# Patient Record
Sex: MALE | Race: AMERICAN INDIAN OR ALASKA NATIVE | NOT HISPANIC OR LATINO | Employment: FULL TIME | URBAN - METROPOLITAN AREA
[De-identification: names, ages, dates, MRNs, and addresses within clinical notes are randomized per-mention and may not be internally consistent; named-entity substitution may affect disease eponyms.]

---

## 2021-06-26 ENCOUNTER — HOSPITAL ENCOUNTER (EMERGENCY)
Facility: CLINIC | Age: 31
Discharge: OTHER INSTITUTION | End: 2021-06-26
Attending: EMERGENCY MEDICINE | Admitting: EMERGENCY MEDICINE
Payer: MEDICAID

## 2021-06-26 VITALS
HEART RATE: 77 BPM | TEMPERATURE: 96.4 F | OXYGEN SATURATION: 99 % | RESPIRATION RATE: 14 BRPM | DIASTOLIC BLOOD PRESSURE: 89 MMHG | SYSTOLIC BLOOD PRESSURE: 136 MMHG

## 2021-06-26 DIAGNOSIS — F19.10 POLYSUBSTANCE ABUSE (H): ICD-10-CM

## 2021-06-26 PROCEDURE — 99283 EMERGENCY DEPT VISIT LOW MDM: CPT

## 2021-06-26 PROCEDURE — 99283 EMERGENCY DEPT VISIT LOW MDM: CPT | Performed by: EMERGENCY MEDICINE

## 2021-06-26 RX ORDER — FUROSEMIDE 20 MG
20 TABLET ORAL
COMMUNITY

## 2021-06-26 RX ORDER — MIRTAZAPINE 15 MG/1
15 TABLET, ORALLY DISINTEGRATING ORAL AT BEDTIME
COMMUNITY

## 2021-06-26 RX ORDER — LEVOTHYROXINE SODIUM 100 UG/1
100 TABLET ORAL DAILY
COMMUNITY

## 2021-06-26 RX ORDER — VENLAFAXINE 37.5 MG/1
37.5 TABLET ORAL DAILY
COMMUNITY

## 2021-06-26 RX ORDER — GABAPENTIN 300 MG/1
900 CAPSULE ORAL 4 TIMES DAILY
COMMUNITY

## 2021-06-26 NOTE — ED PROVIDER NOTES
ED Provider Note  [facility]      History     Chief Complaint   Patient presents with     Addiction Problem     Detox from heroine, methamphetamine     HPI  Moilna Figueroa is a 31 year old male presents stating that he wants to detox from methamphetamine and heroin.  Patient states that he relapsed sometime in the past week and was kicked out of his sober living house.  He apparently is using an unspecified amount of heroin and methamphetamine the past several days.    Past Medical History  Past Medical History:   Diagnosis Date     Anxiety      Binge eating      Bipolar 1 disorder (H)     not extinguished between bipolar 1 or 2 per pt     Depressive disorder      Insomnia      PTSD (post-traumatic stress disorder)      Thyroid disease      History reviewed. No pertinent surgical history.  furosemide (LASIX) 20 MG tablet  gabapentin (NEURONTIN) 300 MG capsule  levothyroxine (SYNTHROID/LEVOTHROID) 100 MCG tablet  mirtazapine (REMERON SOL-TAB) 15 MG ODT  venlafaxine (EFFEXOR) 37.5 MG tablet  hydrocortisone with aloe 1 % Crea cream      No Known Allergies  Family History  No family history on file.  Social History   Social History     Tobacco Use     Smoking status: Current Every Day Smoker     Smokeless tobacco: Current User   Substance Use Topics     Alcohol use: Not Currently     Drug use: Yes     Types: Methamphetamines     Comment: heroine last use wednesday night      Past medical history, past surgical history, medications, allergies, family history, and social history were reviewed with the patient. No additional pertinent items.       Review of Systems  A complete review of systems was performed with pertinent positives and negatives noted in the HPI, and all other systems negative.    Physical Exam   BP: (!) 143/96  Pulse: 80  Temp: 96  F (35.6  C)  Resp: 18  SpO2: 96 %  Physical Exam  Constitutional:       General: He is not in acute distress.     Appearance: He is obese. He  is not diaphoretic.   HENT:      Head: Atraumatic.   Eyes:      General: No scleral icterus.     Pupils: Pupils are equal, round, and reactive to light.   Cardiovascular:      Heart sounds: Normal heart sounds.   Pulmonary:      Effort: No respiratory distress.      Breath sounds: Normal breath sounds.   Abdominal:      General: Bowel sounds are normal.      Palpations: Abdomen is soft.      Tenderness: There is no abdominal tenderness.   Musculoskeletal:         General: No tenderness.   Skin:     General: Skin is warm.      Findings: No rash.   Psychiatric:         Attention and Perception: He is inattentive. He does not perceive visual hallucinations.         Mood and Affect: Mood normal. Affect is inappropriate.         Speech: Speech is delayed.         Behavior: Behavior is slowed. Behavior is cooperative.         Cognition and Memory: Cognition is impaired. Memory is impaired.         Judgment: Judgment is impulsive and inappropriate.         ED Course      Procedures        The medical record was reviewed and interpreted.       No results found for any visits on 06/26/21.  Medications - No data to display     Assessments & Plan (with Medical Decision Making)   31-year-old male who presented initially asking for detox from meth, Klonopin and Suboxone.  Differential included intoxication, malingering, mental illness, withdrawal, metabolic derangement, closed head injury.  Patient was noted initially to be manifesting symptoms of intoxication upon presentation.  He was observed in the emergency room more than 8 hours most of which time he was sleeping.  He was rechecked upon awakening and states that he recently relapsed on methamphetamine.  He is not homicidal, suicidal, psychotic or holdable.  He will be given rule 25 information.  I have asked him to follow-up with his primary prescribing provider if he is intent on discontinuing Klonopin and Suboxone.    I have reviewed the nursing notes. I have reviewed  the findings, diagnosis, plan and need for follow up with the patient.    New Prescriptions    No medications on file       Final diagnoses:   Polysubstance abuse (H)       --  Yohan Kurtz MD  [facility] EMERGENCY DEPARTMENT  6/26/2021     Yohan Kurtz MD  06/26/21 0016

## 2021-12-21 ENCOUNTER — HOSPITAL ENCOUNTER (EMERGENCY)
Facility: HOSPITAL | Age: 31
Discharge: HOME | End: 2021-12-21
Attending: EMERGENCY MEDICINE | Admitting: EMERGENCY MEDICINE
Payer: MEDICAID

## 2021-12-21 VITALS
HEART RATE: 78 BPM | SYSTOLIC BLOOD PRESSURE: 120 MMHG | TEMPERATURE: 97.3 F | DIASTOLIC BLOOD PRESSURE: 74 MMHG | WEIGHT: 300 LBS | RESPIRATION RATE: 16 BRPM | OXYGEN SATURATION: 95 % | BODY MASS INDEX: 39.58 KG/M2

## 2021-12-21 DIAGNOSIS — R21 RASH: ICD-10-CM

## 2021-12-21 PROCEDURE — 99283 EMERGENCY DEPT VISIT LOW MDM: CPT

## 2021-12-21 RX ORDER — PREDNISONE 20 MG/1
40 TABLET ORAL DAILY
Qty: 10 TABLET | Refills: 0 | Status: SHIPPED | OUTPATIENT
Start: 2021-12-21 | End: 2021-12-26

## 2021-12-21 NOTE — DISCHARGE INSTRUCTIONS
Take the low dose steroid for the next 5 days to see if this helps get rid of your rash.  Follow up with your primary care physician for reevaluation or return back to the ED sooner for any worsening rash or other new or concerning symptoms.

## 2021-12-21 NOTE — ED PROVIDER NOTES
ED Provider In Triage Note  [facility]  Encounter Date: Dec 21, 2021    Chief Complaint   Patient presents with     Rash       Brief HPI:   Molina Figueroa is a 31 year old male presenting to the Emergency Department with a chief complaint of a rash on his chest and upper abdomen intermittently for the last few months.  The rash is itching and typically resolves on its own.  The patient was seen in the ED for this same rash and told that it was hives. The patient denies any shortness of breath or angioedema. The patient denies any associated foods, soaps, or detergents.     ROS:  Denies any shortness of breath, angioedema, chest pain, or nausea and vomiting.  8 point ROS is otherwise negative.     SOCIAL: Daily smoker    PMH: Thyroid disease, Anxiety, Depression      Brief Physical Exam:  /74   Pulse 78   Temp 97.3  F (36.3  C) (Temporal)   Resp 16   Wt 136.1 kg (300 lb)   SpO2 95%   BMI 39.58 kg/m    General presentation: Alert, Vital signs reviewed. No apparent distress.   HENT: ENT inspection is normal. Oropharynx is moist and clear.   Eye: Pupils are equal and reactive to light. EOMI  Neck: The neck is supple.  Pulmonary: Currently in no acute respiratory distress.   Circulatory: Peripheral pulses are strong and equal in the bilateral upper lower extremities.   Neurologic: Alert, oriented to person, place, and time. No gross motor or sensory deficits noted in the upper or lower extremities. Cranial nerves II through XII are grossly intact.  Musculoskeletal: No extremity tenderness. Full range of motion in all extremities. No extremity edema.   Skin: No rash.  Skin color is normal.  Warm. Dry to touch.       MDM  31-year-old male presented to the ED for evaluation of intravaginal blood was noted.  The patient was told that his rash likely represented hives.  Patient had no other symptoms associated with the rash.  Here in the ED the patient was hemodynamically stable upon  arrival.  He did not appear to be in any obvious distress or comfort.  On exam there was no clear rash noted on his chest or abdomen.     Following evaluation the patient was educated and reassured.  The exact cause of the intermittent rash that has been ongoing for last 4 months remains unclear.  Intermittent urticaria versus  contact dermatitis of both possible etiologies.  Patient sent with a 5-day course of prednisone to see if it would help relieve his symptoms.   the patient was instructed to follow-up with his primary care physician for reevaluation or to return to ED sooner for any worsening rash or any other new or concerning symptoms.    IMPRESSION  1. Rash          Leonard Leon DO on 12/21/2021 at 4:19 PM    Patient was evaluated by the Physician in Triage due to a limitation of available rooms in the Emergency Department. A plan of care was discussed based on the information obtained on the initial evaluation and patient was consuled to return back to the Emergency Department lobby after this initial evalutaiton until results were obtained or a room became available in the Emergency Department. Patient was counseled not to leave prior to receiving the results of their workup.        Leonard Leon DO  12/21/21 7530

## 2021-12-29 ENCOUNTER — HOSPITAL ENCOUNTER (EMERGENCY)
Facility: HOSPITAL | Age: 31
Discharge: HOME | End: 2021-12-29
Attending: EMERGENCY MEDICINE | Admitting: EMERGENCY MEDICINE
Payer: MEDICAID

## 2021-12-29 VITALS
DIASTOLIC BLOOD PRESSURE: 85 MMHG | TEMPERATURE: 97.1 F | OXYGEN SATURATION: 98 % | BODY MASS INDEX: 44.15 KG/M2 | RESPIRATION RATE: 16 BRPM | HEART RATE: 64 BPM | SYSTOLIC BLOOD PRESSURE: 138 MMHG | WEIGHT: 315 LBS

## 2021-12-29 DIAGNOSIS — R19.7 DIARRHEA, UNSPECIFIED TYPE: ICD-10-CM

## 2021-12-29 LAB
ANION GAP SERPL CALCULATED.3IONS-SCNC: 9 MMOL/L (ref 5–18)
BASOPHILS # BLD AUTO: 0 10E3/UL (ref 0–0.2)
BASOPHILS NFR BLD AUTO: 1 %
BUN SERPL-MCNC: 11 MG/DL (ref 8–22)
CALCIUM SERPL-MCNC: 9 MG/DL (ref 8.5–10.5)
CHLORIDE BLD-SCNC: 101 MMOL/L (ref 98–107)
CO2 SERPL-SCNC: 28 MMOL/L (ref 22–31)
CREAT SERPL-MCNC: 0.77 MG/DL (ref 0.7–1.3)
EOSINOPHIL # BLD AUTO: 0.2 10E3/UL (ref 0–0.7)
EOSINOPHIL NFR BLD AUTO: 3 %
ERYTHROCYTE [DISTWIDTH] IN BLOOD BY AUTOMATED COUNT: 13.6 % (ref 10–15)
GFR SERPL CREATININE-BSD FRML MDRD: >90 ML/MIN/1.73M2
GLUCOSE BLD-MCNC: 93 MG/DL (ref 70–125)
HCT VFR BLD AUTO: 44.8 % (ref 40–53)
HGB BLD-MCNC: 14.2 G/DL (ref 13.3–17.7)
IMM GRANULOCYTES # BLD: 0 10E3/UL
IMM GRANULOCYTES NFR BLD: 0 %
LYMPHOCYTES # BLD AUTO: 2.5 10E3/UL (ref 0.8–5.3)
LYMPHOCYTES NFR BLD AUTO: 31 %
MCH RBC QN AUTO: 28.5 PG (ref 26.5–33)
MCHC RBC AUTO-ENTMCNC: 31.7 G/DL (ref 31.5–36.5)
MCV RBC AUTO: 90 FL (ref 78–100)
MONOCYTES # BLD AUTO: 0.3 10E3/UL (ref 0–1.3)
MONOCYTES NFR BLD AUTO: 4 %
NEUTROPHILS # BLD AUTO: 5.2 10E3/UL (ref 1.6–8.3)
NEUTROPHILS NFR BLD AUTO: 61 %
NRBC # BLD AUTO: 0 10E3/UL
NRBC BLD AUTO-RTO: 0 /100
PLATELET # BLD AUTO: 248 10E3/UL (ref 150–450)
POTASSIUM BLD-SCNC: 4.1 MMOL/L (ref 3.5–5)
RBC # BLD AUTO: 4.99 10E6/UL (ref 4.4–5.9)
SODIUM SERPL-SCNC: 138 MMOL/L (ref 136–145)
WBC # BLD AUTO: 8.3 10E3/UL (ref 4–11)

## 2021-12-29 PROCEDURE — 99283 EMERGENCY DEPT VISIT LOW MDM: CPT | Mod: 25

## 2021-12-29 PROCEDURE — 82310 ASSAY OF CALCIUM: CPT | Performed by: EMERGENCY MEDICINE

## 2021-12-29 PROCEDURE — 85025 COMPLETE CBC W/AUTO DIFF WBC: CPT | Performed by: EMERGENCY MEDICINE

## 2021-12-29 PROCEDURE — 258N000003 HC RX IP 258 OP 636: Performed by: EMERGENCY MEDICINE

## 2021-12-29 PROCEDURE — 96360 HYDRATION IV INFUSION INIT: CPT

## 2021-12-29 PROCEDURE — 36415 COLL VENOUS BLD VENIPUNCTURE: CPT | Performed by: EMERGENCY MEDICINE

## 2021-12-29 PROCEDURE — 96361 HYDRATE IV INFUSION ADD-ON: CPT

## 2021-12-29 RX ORDER — ONDANSETRON 2 MG/ML
4 INJECTION INTRAMUSCULAR; INTRAVENOUS ONCE
Status: DISCONTINUED | OUTPATIENT
Start: 2021-12-29 | End: 2021-12-30 | Stop reason: HOSPADM

## 2021-12-29 RX ADMIN — SODIUM CHLORIDE 1000 ML: 9 INJECTION, SOLUTION INTRAVENOUS at 18:27

## 2021-12-29 ASSESSMENT — ENCOUNTER SYMPTOMS
UNEXPECTED WEIGHT CHANGE: 1
DIARRHEA: 1
FEVER: 0
VOMITING: 0
ABDOMINAL PAIN: 1
BLOOD IN STOOL: 0

## 2021-12-29 NOTE — Clinical Note
Molina Figueroa was seen and treated in our emergency department on 12/29/2021.  He may return to work on 12/31/2021.       If you have any questions or concerns, please don't hesitate to call.      Valarie Birmingham MD

## 2021-12-29 NOTE — ED PROVIDER NOTES
4:11 PM.    Subjective: Diarrhea for the last 3 weeks.  He notes bowel movements least once or twice a day.  Physical therapist told him he was down 10 to 15 pounds.  He denies feeling lightheaded.    Objective: Blood pressure 131/86.    Assessment: Protracted diarrhea and possible dehydration.    Plan: Basic lab work and IV hydration.     Chi Toribio MD  12/29/21 6320

## 2021-12-29 NOTE — Clinical Note
Molina Figueroa was seen and treated in our emergency department on 12/29/2021.  He may return to work on 12/30/2021.       If you have any questions or concerns, please don't hesitate to call.      Valarie Birmingham MD

## 2021-12-30 ASSESSMENT — ENCOUNTER SYMPTOMS
NAUSEA: 1
SHORTNESS OF BREATH: 0

## 2021-12-30 NOTE — DISCHARGE INSTRUCTIONS
You were seen in the Emergency Department today for diarrhea.      You can try taking immodium to help with the diarrhea. This can be purchased over the counter. You should follow up with your primary doctor to determine whether you would benefit from seeing a GI doctor since your diarrhea has been going on for so long.       Please return to the ER if you experience fever, bloody stools, abdominal pain, inability to keep food/fluids down, and/or for any other new or concerning symptoms, otherwise please follow up with your primary doctor in 3-5 days for recheck.     Below is some information you might find useful.

## 2021-12-30 NOTE — ED PROVIDER NOTES
EMERGENCY DEPARTMENT ENCOUNTER      NAME: Molina Figueroa  YOB: 1990  MRN: [id number]      FINAL IMPRESSION  1. Diarrhea, unspecified type        MEDICAL DECISION MAKING   Pertinent Labs & Imaging studies reviewed. (See chart for details)    Molina Figueroa is a 31 year old male who presents for evaluation of persistent diarrhea.  Patient reports a 3-week history of ongoing watery diarrhea.  He states that he has lost approximately 20 pounds over this period of time.  He experiences occasional lower abdominal cramping but has no other associated symptoms.  No recent travel or sick contacts.  No new foods.  No history of chronic GI disorder.  Vitals on arrival stable. Remainder of history and exam, as below.     Unfortunately, there was a lack of bed availability in the department and patient waited in triage for approximately 5 hours.  He then voiced desire to go home and apparently, stood up from his chair and told the entire triage area that he had been having diarrhea for 3 weeks but would like to leave.  At this time, labs ordered by triage MD had returned and were unremarkable.  No evidence of acidosis, acute kidney injury, electrolyte derangement, or leukocytosis.  Patient received 1 L of normal saline and although he continued to feel tired, did feel a bit better as compared to arrival.  At this point, etiology of ongoing diarrhea is unclear but patient is not at all interested in staying for further evaluation/management.  He has not yet left a stool sample.  I reviewed results of labs and he felt reassured to the point that he stated he would like to go home and follow-up with his primary care provider.  We will plan to send with the contact information for [facility] as well given duration of symptoms.  In the meantime, I recommended patient push fluids and try Imodium for symptoms.    The importance of close follow up was discussed. We reviewed warning signs and symptoms, and I  instructed Mr. Figueroa to return to the emergency department immediately if he develops any new or worsening symptoms. I provided additional verbal discharge instructions. Mr. Figueroa expressed understanding and agreement with this plan of care, his questions were answered, and he was discharged in stable condition.         ED COURSE  9:32 PM I met with the patient, obtained history, performed an initial exam, and discussed options and plan for diagnostics and treatment here in the ED.   9:49 PM We discussed the plan for discharge and the patient is agreeable. Reviewed supportive cares, symptomatic treatment, outpatient follow up, and reasons to return to the Emergency Department. Patient to be discharged by ED RN.          MEDICATIONS GIVEN IN THE ED  Medications   ondansetron (ZOFRAN) injection 4 mg (4 mg Intravenous Not Given 12/29/21 7688)   0.9% sodium chloride BOLUS (1,000 mLs Intravenous New Bag 12/29/21 9467)       NEW PRESCRIPTIONS STARTED AT TODAY'S VISIT  New Prescriptions    No medications on file          =================================================================    Chief Complaint   Patient presents with     Diarrhea         HPI:    Patient information was obtained from: Patient    Use of : N/A     Molina RYAN Figueroa is a 31 year old male with no pertinent past medical history, who presents, via walk in, for evaluation of diarrhea.    The patient reports three weeks of diarrhea.  He states that since the diarrhea started he has lost 20 pounds.  He does report some lower abdominal cramping with the diarrhea. He denies any noticed blood in stool.  He denies any recent travel.  He denies any fevers, vomiting, or other complaints at this time,     RELEVANT HISTORY, MEDICATIONS, & ALLERGIES   Past medical history, surgical history, family history, medications, and allergies reviewed and pertinent noted in HPI. See end of note for comprehensive list.    REVIEW OF SYSTEMS:  Review of  Systems   Constitutional: Positive for unexpected weight change (lost 20 pounds). Negative for fever.   Respiratory: Negative for shortness of breath.    Cardiovascular: Negative for chest pain.   Gastrointestinal: Positive for abdominal pain (lower, cramping), diarrhea and nausea. Negative for blood in stool and vomiting.   Genitourinary: Negative.    All other systems reviewed and are negative.    PHYSICAL EXAM:    Vitals: /86   Pulse 75   Temp 97.1  F (36.2  C) (Temporal)   Resp 14   Wt (!) 151.8 kg (334 lb 10.5 oz)   SpO2 95%   BMI 44.15 kg/m    General: Awake, alert, interactive. .   Eyes: PERRL.   HENT: Atraumatic. MMM.   Neck: Full AROM.  Cardiovascular: Regular rate.  Respiratory/Chest: Normal work of breathing.   Abdomen: Non-distended.   Musculoskeletal: Normal appearing extremities without obvious deformities or signs of trauma.   Skin: Normal color. No rash or diaphoresis.  Neurologic: Alert, oriented. Speech clear. CN's grossly intact. Moving all extremities spontaneously.   Psychiatric: Normal affect/mood.      LAB  Labs Ordered and Resulted from Time of ED Arrival to Time of ED Departure   BASIC METABOLIC PANEL - Normal       Result Value    Sodium 138      Potassium 4.1      Chloride 101      Carbon Dioxide (CO2) 28      Anion Gap 9      Urea Nitrogen 11      Creatinine 0.77      Calcium 9.0      Glucose 93      GFR Estimate >90     CBC WITH PLATELETS AND DIFFERENTIAL    WBC Count 8.3      RBC Count 4.99      Hemoglobin 14.2      Hematocrit 44.8      MCV 90      MCH 28.5      MCHC 31.7      RDW 13.6      Platelet Count 248      % Neutrophils 61      % Lymphocytes 31      % Monocytes 4      % Eosinophils 3      % Basophils 1      % Immature Granulocytes 0      NRBCs per 100 WBC 0      Absolute Neutrophils 5.2      Absolute Lymphocytes 2.5      Absolute Monocytes 0.3      Absolute Eosinophils 0.2      Absolute Basophils 0.0      Absolute Immature Granulocytes 0.0      Absolute NRBCs 0.0      CLOSTRIDIUM DIFFICILE TOXIN B   ENTERIC BACTERIA AND VIRUS PANEL BY GEOVANNY STOOL       RADIOLOGY  No orders to display       Comprehensive outline of EPIC chart Hx  PAST MEDICAL HISTORY    Past Medical History:   Diagnosis Date     Anxiety      Binge eating      Bipolar 1 disorder (H)     not extinguished between bipolar 1 or 2 per pt     Depressive disorder      Insomnia      PTSD (post-traumatic stress disorder)      Thyroid disease      No past surgical history on file.    CURRENT MEDICATIONS    Current Outpatient Medications   Medication Instructions     furosemide (LASIX) 20 mg, Oral     gabapentin (NEURONTIN) 900 mg, Oral, 4 TIMES DAILY     hydrocortisone with aloe 1 % Crea cream [HYDROCORTISONE WITH ALOE 1 % CREA CREAM] Apply to affected area 2 times daily for 2 weeks     levothyroxine (SYNTHROID/LEVOTHROID) 100 mcg, Oral, DAILY     mirtazapine (REMERON SOL-TAB) 15 mg, Orally disintegrating tablet, AT BEDTIME     venlafaxine (EFFEXOR) 37.5 mg, Oral, DAILY       ALLERGIES    No Known Allergies    FAMILY HISTORY    No family history on file.    SOCIAL HISTORY    Social History     Socioeconomic History     Marital status: Single     Spouse name: Not on file     Number of children: Not on file     Years of education: Not on file     Highest education level: Not on file   Occupational History     Not on file   Tobacco Use     Smoking status: Current Every Day Smoker     Smokeless tobacco: Current User   Substance and Sexual Activity     Alcohol use: Not Currently     Drug use: Yes     Types: Methamphetamines     Comment: heroine last use wednesday night     Sexual activity: Not on file   Other Topics Concern     Not on file   Social History Narrative     Not on file     Social Determinants of Health     Financial Resource Strain: Not on file   Food Insecurity: Not on file   Transportation Needs: Not on file   Physical Activity: Not on file   Stress: Not on file   Social Connections: Not on file   Intimate Partner  Violence: Not on file   Housing Stability: Not on file       I, Benson Peacock, am serving as a scribe to document services personally performed by Dr. Valarie Birmingham based on my observation and the provider's statements to me. I, Valarie Birmingham MD attest that Benson Ayush is acting in a scribe capacity, has observed my performance of the services and has documented them in accordance with my direction.    Valarie Birmingham M.D.  Emergency Medicine  [facility] EMERGENCY DEPARTMENT  [address] [place] [id number]  [phone]  Dept: [phone]      Valarie Birmingham MD  12/30/21 5431

## 2022-12-19 ENCOUNTER — APPOINTMENT (OUTPATIENT)
Dept: RADIOLOGY | Facility: HOSPITAL | Age: 32
End: 2022-12-19
Attending: STUDENT IN AN ORGANIZED HEALTH CARE EDUCATION/TRAINING PROGRAM
Payer: MEDICAID

## 2022-12-19 ENCOUNTER — HOSPITAL ENCOUNTER (EMERGENCY)
Facility: HOSPITAL | Age: 32
Discharge: HOME | End: 2022-12-19
Attending: EMERGENCY MEDICINE | Admitting: EMERGENCY MEDICINE
Payer: MEDICAID

## 2022-12-19 VITALS
HEART RATE: 81 BPM | RESPIRATION RATE: 20 BRPM | TEMPERATURE: 98.1 F | WEIGHT: 315 LBS | OXYGEN SATURATION: 92 % | DIASTOLIC BLOOD PRESSURE: 75 MMHG | SYSTOLIC BLOOD PRESSURE: 170 MMHG | BODY MASS INDEX: 42.9 KG/M2

## 2022-12-19 DIAGNOSIS — J45.901 MILD ASTHMA WITH EXACERBATION, UNSPECIFIED WHETHER PERSISTENT: ICD-10-CM

## 2022-12-19 LAB
FLUAV RNA SPEC QL NAA+PROBE: NEGATIVE
FLUBV RNA RESP QL NAA+PROBE: NEGATIVE
RSV RNA SPEC NAA+PROBE: NEGATIVE
SARS-COV-2 RNA RESP QL NAA+PROBE: NEGATIVE

## 2022-12-19 PROCEDURE — C9803 HOPD COVID-19 SPEC COLLECT: HCPCS

## 2022-12-19 PROCEDURE — 94640 AIRWAY INHALATION TREATMENT: CPT

## 2022-12-19 PROCEDURE — 87637 SARSCOV2&INF A&B&RSV AMP PRB: CPT | Performed by: EMERGENCY MEDICINE

## 2022-12-19 PROCEDURE — 71046 X-RAY EXAM CHEST 2 VIEWS: CPT

## 2022-12-19 PROCEDURE — 99284 EMERGENCY DEPT VISIT MOD MDM: CPT | Mod: CS,25

## 2022-12-19 PROCEDURE — 250N000009 HC RX 250

## 2022-12-19 RX ORDER — PREDNISONE 50 MG/1
50 TABLET ORAL DAILY
Qty: 5 TABLET | Refills: 0 | Status: SHIPPED | OUTPATIENT
Start: 2022-12-19 | End: 2022-12-19 | Stop reason: ALTCHOICE

## 2022-12-19 RX ORDER — IPRATROPIUM BROMIDE AND ALBUTEROL SULFATE 2.5; .5 MG/3ML; MG/3ML
3 SOLUTION RESPIRATORY (INHALATION) ONCE
Status: COMPLETED | OUTPATIENT
Start: 2022-12-19 | End: 2022-12-19

## 2022-12-19 RX ORDER — PREDNISONE 20 MG/1
TABLET ORAL
Qty: 9 TABLET | Refills: 0 | Status: SHIPPED | OUTPATIENT
Start: 2022-12-19 | End: 2022-12-29

## 2022-12-19 RX ADMIN — IPRATROPIUM BROMIDE AND ALBUTEROL SULFATE 3 ML: .5; 3 SOLUTION RESPIRATORY (INHALATION) at 20:46

## 2022-12-19 RX ADMIN — IPRATROPIUM BROMIDE AND ALBUTEROL SULFATE 3 ML: 2.5; .5 SOLUTION RESPIRATORY (INHALATION) at 20:25

## 2022-12-19 ASSESSMENT — ACTIVITIES OF DAILY LIVING (ADL): ADLS_ACUITY_SCORE: 35

## 2022-12-19 ASSESSMENT — ENCOUNTER SYMPTOMS
VOMITING: 0
SORE THROAT: 0
COUGH: 0
DIARRHEA: 0
ABDOMINAL PAIN: 0
SHORTNESS OF BREATH: 1
NAUSEA: 0
FEVER: 0
CHILLS: 0

## 2022-12-20 NOTE — DISCHARGE INSTRUCTIONS
Your x-ray was negative for pneumonia.  Take prednisone as prescribed.  Use your albuterol inhaler.  Rest.  Follow-up with your primary care doctor to discuss her ED visit and getting a nebulizer machine.  Return to ED if new symptoms develop or symptoms worsen.

## 2022-12-20 NOTE — ED NOTES
I, Charley Curtis have reviewed the documentation, personally taken the patient's history, performed an exam and agree with the physical finds, diagnosis and management plan.    HPI:  Four days ago, the patient developed shortness of breath. He has a history of asthma and works outside most of the day. He went to Urgent Care three days ago, he was given an albuterol inhaler and 40 mg prednisone per day for 5 days. He has had no relief with these medications throughout the past three days. No other complaints at this time.     I, Humaira Ledezma, am serving as a scribe to document services personally performed by Charley Curtis MD, based on my observation and the provider's statements to me. I, Charley Curtis MD, attest that Humaira Ledezma is acting in a scribe capacity, has observed my performance of the services and has documented them in accordance with my direction.      Physical Exam: Minimal left lower quadrant wheezing.  No respiratory distress.  Speaks in full sentences.  Obese.    MDM: Symptoms seem consistent with asthma exacerbation, suspect underlying viral syndrome, influenza, COVID-19.  Overall low concern for pneumonia.    ED Course/workup:   8:15 PM Discussed the patient with Estefanía Gomez PA-C.  8:55 PM Introduced myself and evaluated the patient.     Chest x-ray, COVID/influenza/RSV swab unremarkable.  Patient improved after nebs here.  Will adjust steroids for home.  Discharged with return precautions.         Final Diagnosis:     ICD-10-CM    1. Mild asthma with exacerbation, unspecified whether persistent  J45.901               I personally saw the patient and performed a substantive portion of the visit including all aspects of the medical decision making.     MD Ever Johnson Zabrina N, MD  12/19/22 4572

## 2022-12-20 NOTE — ED TRIAGE NOTES
Pt arrives SOB that has been going on for three days. Pt has a hx of asthma. Pt states he was given a five day dose of prednisone and is on day three but denies it helping. Pt reports inhaler not helping.      Triage Assessment     Row Name 12/19/22 1923       Triage Assessment (Adult)    Airway WDL WDL       Respiratory WDL    Respiratory WDL X;rhythm/pattern    Rhythm/Pattern, Respiratory shortness of breath;tachypneic       Skin Circulation/Temperature WDL    Skin Circulation/Temperature WDL WDL       Cardiac WDL    Cardiac WDL WDL       Peripheral/Neurovascular WDL    Peripheral Neurovascular WDL WDL       Cognitive/Neuro/Behavioral WDL    Cognitive/Neuro/Behavioral WDL WDL

## 2022-12-20 NOTE — ED PROVIDER NOTES
EMERGENCY DEPARTMENT ENCOUNTER      NAME: Molina Figueroa III  AGE: 32 year old male  YOB: 1990  MRN: [id number]  EVALUATION DATE & TIME: 12/19/2022  7:58 PM    PCP: [facility]    ED PROVIDER: Estefanía Gomez PA-C      Chief Complaint   Patient presents with     Shortness of Breath     FINAL IMPRESSION:  1. Mild asthma with exacerbation, unspecified whether persistent      ED COURSE & MEDICAL DECISION MAKING:    Pertinent Labs & Imaging studies reviewed. (See chart for details)  32 year old male presents to the Emergency Department for evaluation of shortness of breath. Vital signs reviewed and unremarkable.  On exam, patient has expiratory wheezes in bilateral lung fields.  Exam is otherwise unremarkable.    Differential diagnosis includes but not limited to asthma exacerbation, pneumonia, COVID, influenza, viral illness. COVID-19 negative.  Influenza is negative.  Chest x-ray was negative.  No signs of infection at this time.  No concern for PE at this time.    Patient received two DuoNeb which improved his symptoms. Upon reevaluation, patient expiratory wheezing in bilateral apex's had resolved.  Patient will be discharged home.  Patient was educated on his imaging, lab results and prescribed medication.  Patient will be prescribed a taper of 40 mg of prednisone for 5 days.  Patient was advised to stop taking his initial prednisone prescription.  Patient will follow up with his primary care clinic to discuss his ED visit and obtain a nebulizer machine.  Patient return to the ED if new symptoms develop or symptoms worsen.  Patient agrees with plan.  All questions were answered.    ED COURSE:   8:19 PM  I saw the patient. I staffed with Dr. Curtis.    8:40 PM I checked on the patient.  Patient manages DuoNeb.  Patient's expiratory wheezes have improved.  An additional DuoNeb has been ordered.   9:08 PM Second duoneb finished. Patient feeling improved. Patient was updated on  results.  Patient be discharged home.  Patient agrees with plan.  All questions were answered.    Additional Documentation    History:    Supplemental history from: Documented in [facility], if applicable    External Record(s) reviewed: Documented in [facility], if applicable.    Work Up:    Chart documentation includes differential considered and any EKGs or imaging interpreted by provider.    In additional to work up documented, I considered the following work up: See chart documentation, if applicable.    External consultation:    Discussion of management with another provider: See chart documentation, if applicable    Complicating factors:    Care impacted by chronic illness: None    Care affected by social determinants of health: N/A    Disposition considerations: Discharge. I prescribed additional prescription strength medication(s) as charted. N/A.      MEDICATIONS GIVEN IN THE EMERGENCY:  Medications   ipratropium - albuterol 0.5 mg/2.5 mg/3 mL (DUONEB) neb solution 3 mL (has no administration in time range)       NEW PRESCRIPTIONS STARTED AT TODAY'S ER VISIT  New Prescriptions    PREDNISONE (DELTASONE) 50 MG TABLET    Take 1 tablet (50 mg) by mouth daily for 5 days       =================================================================    [facility]    Patient information was obtained from: patient.    Use of : N/A     Molina Figueroa III is a 32 year old male with a pertinent history of asthma who presents to this ED for evaluation of shortness of breath.     Four days ago, patient started experience shortness of breath while at work.  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 Patient reports mark and moist environments.  Patient was seen in urgent care 3 days ago.  He was given albuterol inhaler and 40 mg of prednisone for 5 days.  Patient reports no relief with albuterol inhaler and prednisone.    He denies pain with inspiration, fevers, chills, chest pain, congestion, cough, sore throat, abdominal pain, nausea,  vomiting, diarrhea, urinary symptoms.    REVIEW OF SYSTEMS   Review of Systems   Constitutional: Negative for chills and fever.   HENT: Negative for congestion and sore throat.    Respiratory: Positive for shortness of breath. Negative for cough.    Cardiovascular: Negative for chest pain.   Gastrointestinal: Negative for abdominal pain, diarrhea, nausea and vomiting.   Genitourinary: Negative.    All other systems reviewed and are negative.      PAST MEDICAL HISTORY:  Past Medical History:   Diagnosis Date     Anxiety      Anxiety state     Anxiety     Asthma     No Comments Provided     Binge eating      Bipolar 1 disorder (H)     not extinguished between bipolar 1 or 2 per pt     Depressive disorder      Insomnia      Obsessive-compulsive disorder     Obsessive compulsive disorder     Posttraumatic stress disorder     Post traumatic stress disorder     PTSD (post-traumatic stress disorder)      Thyroid disease        PAST SURGICAL HISTORY:  History reviewed. No pertinent surgical history.    CURRENT MEDICATIONS:    predniSONE (DELTASONE) 50 MG tablet  furosemide (LASIX) 20 MG tablet  gabapentin (NEURONTIN) 300 MG capsule  hydrocortisone with aloe 1 % Crea cream  levothyroxine (SYNTHROID/LEVOTHROID) 100 MCG tablet  mirtazapine (REMERON SOL-TAB) 15 MG ODT  venlafaxine (EFFEXOR) 37.5 MG tablet        ALLERGIES:  No Known Allergies    FAMILY HISTORY:  History reviewed. No pertinent family history.    SOCIAL HISTORY:   Social History     Socioeconomic History     Marital status: Single     Spouse name: None     Number of children: None     Years of education: None     Highest education level: None   Tobacco Use     Smoking status: Every Day     Smokeless tobacco: Current   Substance and Sexual Activity     Alcohol use: Not Currently     Drug use: Yes     Types: Methamphetamines     Comment: heroine last use wednesday night   Social History Narrative    ** Merged History Encounter **         Larry is a man of few  words.  He is currently at [facility].  He does request a refill of his medications and they were done.  Complete review of systems was unremarkable.  Preload  04/01/2013       VITALS:  /81   Pulse 79   Temp 98.1  F (36.7  C) (Oral)   Resp 20   Wt 147.5 kg (325 lb 2.9 oz)   SpO2 97%   BMI 42.90 kg/m      PHYSICAL EXAM    Physical Exam  Vitals and nursing note reviewed.   Constitutional:       Appearance: Normal appearance.   HENT:      Head: Atraumatic.      Right Ear: External ear normal.      Left Ear: External ear normal.      Nose: Nose normal.      Mouth/Throat:      Mouth: Mucous membranes are moist.   Eyes:      Conjunctiva/sclera: Conjunctivae normal.      Pupils: Pupils are equal, round, and reactive to light.   Cardiovascular:      Rate and Rhythm: Normal rate and regular rhythm.      Pulses: Normal pulses.      Heart sounds: Normal heart sounds. No murmur heard.    No friction rub. No gallop.   Pulmonary:      Effort: Pulmonary effort is normal.      Breath sounds: Examination of the right-lower field reveals wheezing. Examination of the left-lower field reveals wheezing. Wheezing (expiratory) present. No rales.   Abdominal:      Tenderness: There is no abdominal tenderness. There is no guarding or rebound.   Musculoskeletal:      Cervical back: Normal range of motion.   Skin:     General: Skin is dry.   Neurological:      Mental Status: He is alert. Mental status is at baseline.   Psychiatric:         Mood and Affect: Mood normal.         Thought Content: Thought content normal.        LAB:  All pertinent labs reviewed and interpreted.  Labs Ordered and Resulted from Time of ED Arrival to Time of ED Departure   INFLUENZA A/B & SARS-COV2 PCR MULTIPLEX - Normal       Result Value    Influenza A PCR Negative      Influenza B PCR Negative      RSV PCR Negative      SARS CoV2 PCR Negative          RADIOLOGY:  Reviewed all pertinent imaging. Please see official radiology report.  Chest XR,  PA &  LAT   Final Result   IMPRESSION: Negative chest.          Estefanía Gomez PA-C  [facility] EMERGENCY DEPARTMENT  [facility] [facility] [id number] [phone]     Estefanía Gomez PA-C  12/19/22 [id number]

## 2023-02-18 ENCOUNTER — APPOINTMENT (OUTPATIENT)
Dept: RADIOLOGY | Facility: HOSPITAL | Age: 33
End: 2023-02-18
Attending: PHYSICIAN ASSISTANT
Payer: MEDICAID

## 2023-02-18 ENCOUNTER — HOSPITAL ENCOUNTER (EMERGENCY)
Facility: HOSPITAL | Age: 33
Discharge: HOME | End: 2023-02-18
Admitting: PHYSICIAN ASSISTANT
Payer: MEDICAID

## 2023-02-18 VITALS
DIASTOLIC BLOOD PRESSURE: 70 MMHG | WEIGHT: 315 LBS | RESPIRATION RATE: 12 BRPM | HEART RATE: 72 BPM | OXYGEN SATURATION: 95 % | BODY MASS INDEX: 42.22 KG/M2 | TEMPERATURE: 98.3 F | SYSTOLIC BLOOD PRESSURE: 159 MMHG

## 2023-02-18 DIAGNOSIS — J45.20 MILD INTERMITTENT ASTHMA WITHOUT COMPLICATION: ICD-10-CM

## 2023-02-18 DIAGNOSIS — J20.8 ACUTE VIRAL BRONCHITIS: ICD-10-CM

## 2023-02-18 LAB
ANION GAP SERPL CALCULATED.3IONS-SCNC: 8 MMOL/L (ref 7–15)
BASOPHILS # BLD AUTO: 0 10E3/UL (ref 0–0.2)
BASOPHILS NFR BLD AUTO: 0 %
BUN SERPL-MCNC: 3.7 MG/DL (ref 6–20)
CALCIUM SERPL-MCNC: 9.3 MG/DL (ref 8.6–10)
CHLORIDE SERPL-SCNC: 99 MMOL/L (ref 98–107)
CREAT SERPL-MCNC: 0.72 MG/DL (ref 0.67–1.17)
D DIMER PPP FEU-MCNC: 0.43 UG/ML FEU (ref 0–0.5)
DEPRECATED HCO3 PLAS-SCNC: 29 MMOL/L (ref 22–29)
EOSINOPHIL # BLD AUTO: 0.2 10E3/UL (ref 0–0.7)
EOSINOPHIL NFR BLD AUTO: 3 %
ERYTHROCYTE [DISTWIDTH] IN BLOOD BY AUTOMATED COUNT: 14.8 % (ref 10–15)
FLUAV RNA SPEC QL NAA+PROBE: NEGATIVE
FLUBV RNA RESP QL NAA+PROBE: NEGATIVE
GFR SERPL CREATININE-BSD FRML MDRD: >90 ML/MIN/1.73M2
GLUCOSE SERPL-MCNC: 104 MG/DL (ref 70–99)
HCT VFR BLD AUTO: 47.7 % (ref 40–53)
HGB BLD-MCNC: 15 G/DL (ref 13.3–17.7)
HOLD SPECIMEN: NORMAL
HOLD SPECIMEN: NORMAL
IMM GRANULOCYTES # BLD: 0 10E3/UL
IMM GRANULOCYTES NFR BLD: 0 %
LYMPHOCYTES # BLD AUTO: 1.8 10E3/UL (ref 0.8–5.3)
LYMPHOCYTES NFR BLD AUTO: 20 %
MCH RBC QN AUTO: 26.6 PG (ref 26.5–33)
MCHC RBC AUTO-ENTMCNC: 31.4 G/DL (ref 31.5–36.5)
MCV RBC AUTO: 85 FL (ref 78–100)
MONOCYTES # BLD AUTO: 0.5 10E3/UL (ref 0–1.3)
MONOCYTES NFR BLD AUTO: 6 %
NEUTROPHILS # BLD AUTO: 6.5 10E3/UL (ref 1.6–8.3)
NEUTROPHILS NFR BLD AUTO: 71 %
NRBC # BLD AUTO: 0 10E3/UL
NRBC BLD AUTO-RTO: 0 /100
PLATELET # BLD AUTO: 238 10E3/UL (ref 150–450)
POTASSIUM SERPL-SCNC: 3.8 MMOL/L (ref 3.4–5.3)
RBC # BLD AUTO: 5.64 10E6/UL (ref 4.4–5.9)
RSV RNA SPEC NAA+PROBE: NEGATIVE
SARS-COV-2 RNA RESP QL NAA+PROBE: NEGATIVE
SODIUM SERPL-SCNC: 136 MMOL/L (ref 136–145)
TROPONIN T SERPL HS-MCNC: 6 NG/L
WBC # BLD AUTO: 9 10E3/UL (ref 4–11)

## 2023-02-18 PROCEDURE — 99285 EMERGENCY DEPT VISIT HI MDM: CPT | Mod: 25,CS

## 2023-02-18 PROCEDURE — 84484 ASSAY OF TROPONIN QUANT: CPT | Performed by: STUDENT IN AN ORGANIZED HEALTH CARE EDUCATION/TRAINING PROGRAM

## 2023-02-18 PROCEDURE — 80048 BASIC METABOLIC PNL TOTAL CA: CPT | Performed by: STUDENT IN AN ORGANIZED HEALTH CARE EDUCATION/TRAINING PROGRAM

## 2023-02-18 PROCEDURE — 87637 SARSCOV2&INF A&B&RSV AMP PRB: CPT | Performed by: PHYSICIAN ASSISTANT

## 2023-02-18 PROCEDURE — 85379 FIBRIN DEGRADATION QUANT: CPT | Performed by: PHYSICIAN ASSISTANT

## 2023-02-18 PROCEDURE — 71046 X-RAY EXAM CHEST 2 VIEWS: CPT

## 2023-02-18 PROCEDURE — 250N000012 HC RX MED GY IP 250 OP 636 PS 637: Performed by: PHYSICIAN ASSISTANT

## 2023-02-18 PROCEDURE — 93005 ELECTROCARDIOGRAM TRACING: CPT | Performed by: STUDENT IN AN ORGANIZED HEALTH CARE EDUCATION/TRAINING PROGRAM

## 2023-02-18 PROCEDURE — C9803 HOPD COVID-19 SPEC COLLECT: HCPCS

## 2023-02-18 PROCEDURE — 85025 COMPLETE CBC W/AUTO DIFF WBC: CPT | Performed by: STUDENT IN AN ORGANIZED HEALTH CARE EDUCATION/TRAINING PROGRAM

## 2023-02-18 PROCEDURE — 36415 COLL VENOUS BLD VENIPUNCTURE: CPT | Performed by: STUDENT IN AN ORGANIZED HEALTH CARE EDUCATION/TRAINING PROGRAM

## 2023-02-18 RX ORDER — BENZONATATE 100 MG/1
100 CAPSULE ORAL 3 TIMES DAILY PRN
Qty: 21 CAPSULE | Refills: 0 | Status: SHIPPED | OUTPATIENT
Start: 2023-02-18

## 2023-02-18 RX ORDER — ALBUTEROL SULFATE 90 UG/1
2 AEROSOL, METERED RESPIRATORY (INHALATION) EVERY 6 HOURS PRN
Qty: 18 G | Refills: 0 | Status: SHIPPED | OUTPATIENT
Start: 2023-02-18

## 2023-02-18 RX ORDER — ALBUTEROL SULFATE 5 MG/ML
2.5 SOLUTION RESPIRATORY (INHALATION) EVERY 6 HOURS PRN
Status: DISCONTINUED | OUTPATIENT
Start: 2023-02-18 | End: 2023-02-18

## 2023-02-18 RX ORDER — ALBUTEROL SULFATE 5 MG/ML
2.5 SOLUTION RESPIRATORY (INHALATION) ONCE
Status: COMPLETED | OUTPATIENT
Start: 2023-02-18 | End: 2023-02-18

## 2023-02-18 RX ORDER — PREDNISONE 20 MG/1
TABLET ORAL
Qty: 8 TABLET | Refills: 0 | Status: SHIPPED | OUTPATIENT
Start: 2023-02-18

## 2023-02-18 RX ORDER — PREDNISONE 20 MG/1
40 TABLET ORAL ONCE
Status: COMPLETED | OUTPATIENT
Start: 2023-02-18 | End: 2023-02-18

## 2023-02-18 RX ORDER — IPRATROPIUM BROMIDE AND ALBUTEROL SULFATE 2.5; .5 MG/3ML; MG/3ML
3 SOLUTION RESPIRATORY (INHALATION) ONCE
Status: COMPLETED | OUTPATIENT
Start: 2023-02-18 | End: 2023-02-18

## 2023-02-18 RX ADMIN — PREDNISONE 40 MG: 20 TABLET ORAL at 15:30

## 2023-02-18 ASSESSMENT — ENCOUNTER SYMPTOMS
COUGH: 1
BACK PAIN: 0
RHINORRHEA: 1
SHORTNESS OF BREATH: 1
VOMITING: 0
SINUS PRESSURE: 1
DIZZINESS: 0
CHILLS: 0
FEVER: 0
NAUSEA: 0
WHEEZING: 1

## 2023-02-18 ASSESSMENT — ACTIVITIES OF DAILY LIVING (ADL): ADLS_ACUITY_SCORE: 35

## 2023-02-18 NOTE — ED TRIAGE NOTES
Patient presents here with a productive cough that has occurred over the past four days. He notes that he has had episodes of chest discomfort. He notes that laying flat worsens his shortness of breath.

## 2023-02-18 NOTE — ED NOTES
Pt ok with CXR and steroids.    He's refused nebs ordered.    He'd like to leave.  Said he had a dinner to go to.

## 2023-02-18 NOTE — DISCHARGE INSTRUCTIONS
As we discussed, your symptoms are most likely due to bronchitis or a viral infection that is inflaming your lungs.  We will start you on several days of steroids at home to help with your wheezing and breathing, and I highly recommend that you use your albuterol inhaler as needed.  I will also send you home with a prescription for Tessalon Perles for your cough.  If it anytime you develop a fever, worsening shortness of breath, chest pain, dizziness or near loss of consciousness, please return to the ER for further evaluation.  Otherwise, please follow-up in your primary care clinic this week to make sure symptoms are improving.    Your blood pressure was elevated in the emergencydepartment today and requires recheck and close follow-up in your primary care clinic. Untreated blood pressure can cause serious complications including, but not limited to stroke, heart attack/failure, and kidney disease.  Please make a close follow-up appointment to have this recheck performed. Please return to the emergency department immediately if you develop a severe headache, vision changes, chest pain, shortness of breath, orabdominal pain.

## 2023-02-18 NOTE — ED PROVIDER NOTES
Emergency Department Encounter   NAME: Molina Figueroa III ; AGE: 32 year old male ; YOB: 1990 ; MRN: [id number] ; PCP: Jose Brian Shaun   ED PROVIDER: Rachna Arauz PA-C    Evaluation Date & Time:   No admission date for patient encounter.    CHIEF COMPLAINT:  Shortness of Breath      Impression and Plan   MDM: Molina Figueroa III is a 32 year old male with a pertinent history of anxiety, asthma, binge eating, bipolar 1 disorder, depressive disorder, insomnia, OCD, PTSD, who presents to the ED by private vehicle for evaluation of shortness of breath.  The patient presents to the emergency department for evaluation of 2 weeks of viral type symptoms including rhinorrhea, nasal congestion, and now 4 days of chest tightness, wheezing, productive cough in the setting of asthma and heavy tobacco use.  Here in the ER, he is afebrile and vitally stable.  On exam he is generally well-appearing, speaking in full sentences and is breathing comfortably.  Currently 98% on room air.  He does have inspiratory and expiratory wheezes scattered throughout all lung fields, although no tightness in the bases, crackles or asymmetric breath sounds.  High suspicion at this time for viral illness with asthma exacerbation, reactive airway disease, bronchitis, and other considerations including pneumonia, and discussed plan for chest x-ray, blood work to further assess.  He has reproducible tenderness of his chest wall and suspect his pain is likely due to his persistent coughing, though as there is some pleuritic nature to the pain will obtain a screening D-dimer as patient is low risk for PE especially in the setting of infectious symptoms.      No lower extremity edema or evidence of pulmonary congestion on chest x-ray to suggest CHF or cardiac asthma.  Chest x-ray shows no evidence of pneumonia, pleural effusion or pneumothorax.  COVID-19 and influenza swabs negative.  D-dimer returned within normal  "limits -no further work-up for PE is indicated.  EKG shows a normal sinus rhythm without any evidence of ischemia and initial troponin returned at 6.  As he has had constant chest pain for the past several days and is low risk for ACS, do feel single troponin is sufficient in ruling out cardiac ischemia in this patient.  His clinical presentation is most consistent with a acute viral bronchitis in the setting of asthma.  He refused nebulizer treatments in the ER, and states \"I forgot he had a dinner tonight, do not stay any longer\".  I did refill his albuterol inhaler for him and he was encouraged to use this as well as we will place him on 5 days of prednisone and provide Tessalon Perles for symptomatic relief.  Advise close recheck in clinic as well as concerning signs and symptoms to return to the ER were provided and he verbalized understanding.    Medical Decision Making    History:    Supplemental history from: Documented in chart, if applicable    External Record(s) reviewed: Documented in chart, if applicable.    Work Up:    Chart documentation includes differential considered and any EKGs or imaging independently interpreted by provider, where specified.    In additional to work up documented, I considered the following work up: Documented in chart, if applicable.    External consultation:    Discussion of management with another provider: Documented in chart, if applicable    Complicating factors:    Care impacted by chronic illness: Chronic Lung Disease and Smoking / Nicotine Use    Care affected by social determinants of health: N/A    Disposition considerations: Discharge. I prescribed additional prescription strength medication(s) as charted. See documentation for any additional details.          ED COURSE:  2:50 PM I met and introduced myself to the patient. I gathered initial history and performed my physical exam. We discussed plan for initial workup.   3:36 PM Patient refusing any neb treatments. " Was requesting to leave, but decided to stay after speaking with RN.   4:04 PM rechecked the patient and updated him on results, plan for discharge, follow-up, reasons to return to the ER.      At the conclusion of the encounter I discussed the results of all the tests and the disposition. The questions were answered. The patient or family acknowledged understanding and was agreeable with the care plan.    FINAL IMPRESSION:    ICD-10-CM    1. Acute viral bronchitis  J20.8       2. Mild intermittent asthma without complication  J45.20             MEDICATIONS GIVEN IN THE EMERGENCY DEPARTMENT:  Medications   ipratropium - albuterol 0.5 mg/2.5 mg/3 mL (DUONEB) neb solution 3 mL (3 mLs Nebulization Not Given 2/18/23 1534)   predniSONE (DELTASONE) tablet 40 mg (40 mg Oral Given 2/18/23 1530)   albuterol (PROVENTIL) neb solution 2.5 mg (2.5 mg Nebulization Not Given 2/18/23 1534)         NEW PRESCRIPTIONS STARTED AT TODAY'S ED VISIT:  New Prescriptions    BENZONATATE (TESSALON) 100 MG CAPSULE    Take 1 capsule (100 mg) by mouth 3 times daily as needed for cough    PREDNISONE (DELTASONE) 20 MG TABLET    Take two tablets (= 40mg) each day for 4 (four) days         HPI   Patient information was obtained from: Patient    Use of Intrepreter: N/A    Augustelyn Figueroa III is a 32 year old male with a pertinent history of anxiety, asthma, binge eating, bipolar 1 disorder, depressive disorder, insomnia, OCD, PTSD, who presents to the ED by private vehicle for evaluation of shortness of breath.     The patient presents to the emergency department for evaluation of symptoms that started about 2 weeks ago.  He states his illness began with sinus pressure and congestion as well as yellowish and greenish nasal drainage.  This started to clear up a few days ago, however now he has a productive cough and is bringing up yellowish phlegm.  He has had wheezing and tightness in his chest for the past 4 days.  He does have a history of  asthma and has inhalers at home, however he has not tried them.  He has never been intubated or admitted for his asthma in the past.  He has not had any sick contacts.  He is up-to-date on his COVID-19 and influenza vaccines.  He denies any recent fevers or body aches, and no GI symptoms.  He is a smoker.  Has been smoking a half a pack per day for the last 20 years. No recent cocaine or illicit drug use.         REVIEW OF SYSTEMS:  Review of Systems   Constitutional: Negative for chills and fever.   HENT: Positive for rhinorrhea and sinus pressure.    Respiratory: Positive for cough, shortness of breath and wheezing.    Cardiovascular: Positive for chest pain. Negative for leg swelling.   Gastrointestinal: Negative for nausea and vomiting.   Musculoskeletal: Negative for back pain.   Neurological: Negative for dizziness and syncope.   All other systems reviewed and are negative.      Medical History     Past Medical History:   Diagnosis Date     Anxiety      Anxiety state      Asthma      Binge eating      Bipolar 1 disorder (H)      Depressive disorder      Insomnia      Obsessive-compulsive disorder      Posttraumatic stress disorder      PTSD (post-traumatic stress disorder)      Thyroid disease        No past surgical history on file.    No family history on file.    Social History     Tobacco Use     Smoking status: Every Day     Smokeless tobacco: Current   Substance Use Topics     Alcohol use: Not Currently     Drug use: Yes     Types: Methamphetamines     Comment: heroine last use wednesday night       benzonatate (TESSALON) 100 MG capsule  predniSONE (DELTASONE) 20 MG tablet  furosemide (LASIX) 20 MG tablet  gabapentin (NEURONTIN) 300 MG capsule  hydrocortisone with aloe 1 % Crea cream  levothyroxine (SYNTHROID/LEVOTHROID) 100 MCG tablet  mirtazapine (REMERON SOL-TAB) 15 MG ODT  venlafaxine (EFFEXOR) 37.5 MG tablet          Physical Exam     First Vitals:  Patient Vitals for the past 24 hrs:   BP Temp Temp  src Pulse Resp SpO2 Weight   02/18/23 1530 (!) 159/77 -- -- 82 13 98 % --   02/18/23 1515 (!) 143/71 -- -- 83 13 96 % --   02/18/23 1500 139/71 -- -- 81 10 97 % --   02/18/23 1445 136/73 -- -- 93 -- -- --   02/18/23 1426 (!) 154/84 98.3  F (36.8  C) Oral 100 16 96 % 145.2 kg (320 lb)         PHYSICAL EXAM:   Physical Exam  Vitals and nursing note reviewed.   Constitutional:       General: He is not in acute distress.     Appearance: He is not toxic-appearing.      Comments: Elevated BMI.    HENT:      Head: Normocephalic.   Cardiovascular:      Rate and Rhythm: Normal rate and regular rhythm.      Pulses: Normal pulses.      Heart sounds: Normal heart sounds. No murmur heard.  Pulmonary:      Effort: Pulmonary effort is normal. No tachypnea.      Comments: Speaking in full sentences.  Faint inspiratory and expiratory wheezes throughout all lung fields.  Moving air in the bases.  No respiratory distress or accessory muscle use.  Chest:      Chest wall: Tenderness (mid chest) present.   Musculoskeletal:      Comments: No lower extremity edema or tenderness.   Skin:     General: Skin is warm and dry.   Neurological:      Mental Status: He is alert.             Results     LAB:  All pertinent labs reviewed and interpreted  Labs Ordered and Resulted from Time of ED Arrival to Time of ED Departure   BASIC METABOLIC PANEL - Abnormal       Result Value    Sodium 136      Potassium 3.8      Chloride 99      Carbon Dioxide (CO2) 29      Anion Gap 8      Urea Nitrogen 3.7 (*)     Creatinine 0.72      Calcium 9.3      Glucose 104 (*)     GFR Estimate >90     CBC WITH PLATELETS AND DIFFERENTIAL - Abnormal    WBC Count 9.0      RBC Count 5.64      Hemoglobin 15.0      Hematocrit 47.7      MCV 85      MCH 26.6      MCHC 31.4 (*)     RDW 14.8      Platelet Count 238      % Neutrophils 71      % Lymphocytes 20      % Monocytes 6      % Eosinophils 3      % Basophils 0      % Immature Granulocytes 0      NRBCs per 100 WBC 0       Absolute Neutrophils 6.5      Absolute Lymphocytes 1.8      Absolute Monocytes 0.5      Absolute Eosinophils 0.2      Absolute Basophils 0.0      Absolute Immature Granulocytes 0.0      Absolute NRBCs 0.0     TROPONIN T, HIGH SENSITIVITY - Normal    Troponin T, High Sensitivity 6     INFLUENZA A/B & SARS-COV2 PCR MULTIPLEX - Normal    Influenza A PCR Negative      Influenza B PCR Negative      RSV PCR Negative      SARS CoV2 PCR Negative     D DIMER QUANTITATIVE - Normal    D-Dimer Quantitative 0.43         RADIOLOGY:  Chest XR,  PA & LAT   Final Result   IMPRESSION: Negative chest.            ECG:  Performed at: 14:45:24    Impression: Sinus rhythm. Normal ECG.     Rate: 91 bpm   Rhythm: sinus   Axis: 66 78 63  SC Interval: 130  QRS Interval: 106  QTc Interval: 405  ST Changes: None   Comparison: None     EKG results reviewed and interpreted by Dr. Curtis, ED MD.       Rachna Arauz PA-C   Emergency Medicine   [facility] EMERGENCY DEPARTMENT       Rachna Arauz PA-C  02/18/23 0357

## 2023-02-20 ENCOUNTER — APPOINTMENT (OUTPATIENT)
Dept: RADIOLOGY | Facility: HOSPITAL | Age: 33
End: 2023-02-20
Payer: MEDICAID

## 2023-02-20 ENCOUNTER — HOSPITAL ENCOUNTER (EMERGENCY)
Facility: HOSPITAL | Age: 33
Discharge: HOME | End: 2023-02-20
Attending: EMERGENCY MEDICINE | Admitting: EMERGENCY MEDICINE
Payer: MEDICAID

## 2023-02-20 VITALS
SYSTOLIC BLOOD PRESSURE: 148 MMHG | BODY MASS INDEX: 41.75 KG/M2 | DIASTOLIC BLOOD PRESSURE: 90 MMHG | HEIGHT: 73 IN | RESPIRATION RATE: 18 BRPM | WEIGHT: 315 LBS | TEMPERATURE: 97.1 F | HEART RATE: 99 BPM | OXYGEN SATURATION: 98 %

## 2023-02-20 DIAGNOSIS — B34.9 VIRAL ILLNESS: ICD-10-CM

## 2023-02-20 PROCEDURE — 99283 EMERGENCY DEPT VISIT LOW MDM: CPT | Mod: 25

## 2023-02-20 PROCEDURE — 94640 AIRWAY INHALATION TREATMENT: CPT

## 2023-02-20 PROCEDURE — 71046 X-RAY EXAM CHEST 2 VIEWS: CPT

## 2023-02-20 PROCEDURE — 250N000009 HC RX 250

## 2023-02-20 RX ORDER — GUAIFENESIN 600 MG/1
1200 TABLET, EXTENDED RELEASE ORAL 2 TIMES DAILY
Qty: 24 TABLET | Refills: 0 | Status: SHIPPED | OUTPATIENT
Start: 2023-02-20 | End: 2023-02-26

## 2023-02-20 RX ADMIN — IPRATROPIUM BROMIDE AND ALBUTEROL 1 PUFF: 20; 100 SPRAY, METERED RESPIRATORY (INHALATION) at 11:30

## 2023-02-20 ASSESSMENT — ENCOUNTER SYMPTOMS
RHINORRHEA: 1
ABDOMINAL PAIN: 0
SHORTNESS OF BREATH: 1
CHILLS: 1
COUGH: 1
FEVER: 0

## 2023-02-20 NOTE — ED PROVIDER NOTES
EMERGENCY DEPARTMENT ENCOUNTER      NAME: Molina Figueroa III  AGE: 32 year old male  YOB: 1990  MRN: [id number]  EVALUATION DATE & TIME: No admission date for patient encounter.    PCP: [facility]    ED PROVIDER: Estefanía Gomez PA-C      Chief Complaint   Patient presents with     Shortness of Breath     FINAL IMPRESSION:  1. Viral illness        ED COURSE & MEDICAL DECISION MAKING:    Pertinent Labs & Imaging studies reviewed. (See chart for details)  32 year old male presents to the Emergency Department for evaluation of cough and rhinorrhea. Been coughing up clear phlegm. Patient was seen 2 days ago for shortness of breath and chest pain.  COVID and influenza was negative.  D-dimer was within normal limits.  EKG was normal sinus rhythm.  He was diagnosed with acute viral bronchitis.  He was prescribed prednisone, Tessalon Perles and albuterol inhaler.  For the past 2 days, patient reports he has been struggling to breathe through his nose.  He has not been using his albuterol inhaler or Tessalon Perles.  He has taken 1 day of prednisone.  Vital signs reviewed and patient is slightly hypertensive.  This is baseline for the patient.  On exam, lungs are clear to auscultation bilaterally.  No chest wall tenderness.  Posterior oropharynx is clear and nonerythematous.  No exudate.  Uvula midline.  No cervical lymphadenopathy.  No sinus tenderness.    Differential diagnosis includes but not limited to pneumonia, hemothorax, pneumothorax, viral illness.  Low suspicion for PE due to negative D-dimer 2 days ago and no new symptoms.  Patient's oxygen saturation is at 98%.  Patient speaking in full sentences.  Low suspicion for ACS.  Patient denies chest pain at this time. Repeat chest x-ray was done which was unremarkable.  Symptoms consistent with viral illness.  Patient was prescribed a Combivent inhaler and Mucinex.  Patient will continue to take prednisone and Tessalon Perles.  Patient will follow-up with his primary care doctor to discuss his ED visit.  Patient return to the ED if new symptoms develop or symptoms worsen. patient agrees with plan.  All questions answered. Patient discharged in a stable condition.       ED COURSE:   10:07 AM I saw the patient.   10:23 AM I staffed the patient with Dr. Cuellar.   11:05 AM I rechecked and updated patient. Discussed plan of discharge. Patient agreeable.        Additional Documentation    History:    Supplemental history from: Documented in chart, if applicable    External Record(s) reviewed: Documented in chart, if applicable.    Work Up:    Chart documentation includes differential considered and any EKGs or imaging interpreted by provider.    In additional to work up documented, I considered the following work up: Documented in chart, if applicable.    External consultation:    Discussion of management with another provider: Documented in chart, if applicable    Complicating factors:    Care impacted by chronic illness: N/A    Care affected by social determinants of health: N/A    Disposition considerations: Discharge. I prescribed additional prescription strength medication(s) as charted. N/A.      MEDICATIONS GIVEN IN THE EMERGENCY:  Medications   ipratropium-albuterol (COMBIVENT RESPIMAT) inhaler 1 puff (has no administration in time range)       NEW PRESCRIPTIONS STARTED AT TODAY'S ER VISIT  New Prescriptions    GUAIFENESIN (MUCINEX) 600 MG 12 HR TABLET    Take 2 tablets (1,200 mg) by mouth 2 times daily for 6 days          =================================================================    HPI    Patient information was obtained from: Patient     Use of : N/A         Molina Figueroa III is a 32 year old male with a pertinent history of anxiety, asthma, bipolar 1 disorder, PTSD, who presents to this ED via private car for evaluation of shortness of breath.     Per chart review, patient was seen 2/18 for shortness of  breath and chest pain. Chest x-ray showed no evidence of pneumonia, pleural effusion or pneumothorax.  COVID-19 and influenza swabs negative.  D-dimer returned within normal limits -no further work-up for PE was indicated.  EKG showed a normal sinus rhythm without any evidence of ischemia and initial troponin returned at 6. Patient diagnosed with acute viral bronchitis in the setting of asthma. Refilled his albuterol inhaler and prescribed 5 days of 40mg prednisone and provide Tessalon Perles for symptomatic relief.    Patient reports two weeks of congestion and runny nose. On 2/18 he developed a productive cough that has become increasingly worse.Been coughing up clear phlegm. Notes he was seen 2/18 and started on 40mg prednisone. He returns to the ED today as he is feeling increasingly short of breath with chills.     Denies chest pain, fever, abdominal pain, leg welling, and leg pain.     REVIEW OF SYSTEMS   Review of Systems   Constitutional: Positive for chills. Negative for fever.   HENT: Positive for congestion and rhinorrhea.    Respiratory: Positive for cough and shortness of breath. Choking: Productive     Cardiovascular: Negative for chest pain and leg swelling.   Gastrointestinal: Negative for abdominal pain.   All other systems reviewed and are negative.       PAST MEDICAL HISTORY:  Past Medical History:   Diagnosis Date     Anxiety      Anxiety state     Anxiety     Asthma     No Comments Provided     Binge eating      Bipolar 1 disorder (H)     not extinguished between bipolar 1 or 2 per pt     Depressive disorder      Insomnia      Obsessive-compulsive disorder     Obsessive compulsive disorder     Posttraumatic stress disorder     Post traumatic stress disorder     PTSD (post-traumatic stress disorder)      Thyroid disease        PAST SURGICAL HISTORY:  No past surgical history on file.    CURRENT MEDICATIONS:    guaiFENesin (MUCINEX) 600 MG 12 hr tablet  albuterol (PROAIR HFA/PROVENTIL HFA/VENTOLIN  "HFA) 108 (90 Base) MCG/ACT inhaler  benzonatate (TESSALON) 100 MG capsule  furosemide (LASIX) 20 MG tablet  gabapentin (NEURONTIN) 300 MG capsule  hydrocortisone with aloe 1 % Crea cream  levothyroxine (SYNTHROID/LEVOTHROID) 100 MCG tablet  mirtazapine (REMERON SOL-TAB) 15 MG ODT  predniSONE (DELTASONE) 20 MG tablet  venlafaxine (EFFEXOR) 37.5 MG tablet        ALLERGIES:  No Known Allergies    FAMILY HISTORY:  No family history on file.    SOCIAL HISTORY:   Social History     Socioeconomic History     Marital status: Single   Tobacco Use     Smoking status: Every Day     Smokeless tobacco: Current   Substance and Sexual Activity     Alcohol use: Not Currently     Drug use: Yes     Types: Methamphetamines     Comment: heroine last use wednesday night   Social History Narrative    ** Merged History Encounter **         Larry is a man of few words.  He is currently at [facility].  He does request a refill of his medications and they were done.  Complete review of systems was unremarkable.  Preload  04/01/2013       VITALS:  BP (!) 148/90   Pulse 99   Temp 97.1  F (36.2  C)   Resp 18   Ht 1.854 m (6' 1\")   Wt 145.2 kg (320 lb)   SpO2 98%   BMI 42.22 kg/m      PHYSICAL EXAM    Physical Exam  Vitals and nursing note reviewed.   Constitutional:       General: He is not in acute distress.     Appearance: Normal appearance. He is well-developed. He is obese. He is not ill-appearing.   HENT:      Head: Atraumatic.      Jaw: There is normal jaw occlusion. No trismus, tenderness, swelling, pain on movement or malocclusion.      Right Ear: Hearing, tympanic membrane, ear canal and external ear normal.      Left Ear: Hearing, tympanic membrane, ear canal and external ear normal.      Nose: Nose normal.      Right Sinus: No maxillary sinus tenderness or frontal sinus tenderness.      Left Sinus: No maxillary sinus tenderness or frontal sinus tenderness.      Mouth/Throat:      Lips: Pink.      Mouth: Mucous membranes are " moist. No injury, lacerations, oral lesions or angioedema.      Tongue: No lesions. Tongue does not deviate from midline.      Palate: No mass and lesions.      Pharynx: Oropharynx is clear. Uvula midline. No pharyngeal swelling, oropharyngeal exudate, posterior oropharyngeal erythema or uvula swelling.      Tonsils: No tonsillar exudate or tonsillar abscesses.   Eyes:      Conjunctiva/sclera: Conjunctivae normal.      Pupils: Pupils are equal, round, and reactive to light.   Cardiovascular:      Rate and Rhythm: Normal rate and regular rhythm.      Pulses: Normal pulses.      Heart sounds: Normal heart sounds. No murmur heard.    No friction rub. No gallop.   Pulmonary:      Effort: Pulmonary effort is normal. No respiratory distress.      Breath sounds: Normal breath sounds. No stridor. No wheezing, rhonchi or rales.   Chest:      Chest wall: No tenderness.   Abdominal:      General: Abdomen is flat. Bowel sounds are normal.      Palpations: Abdomen is soft.      Tenderness: There is no abdominal tenderness. There is no right CVA tenderness, left CVA tenderness, guarding or rebound.   Musculoskeletal:      Cervical back: Normal range of motion.   Skin:     General: Skin is dry.   Neurological:      General: No focal deficit present.      Mental Status: He is alert and oriented to person, place, and time. Mental status is at baseline.      Cranial Nerves: No cranial nerve deficit.   Psychiatric:         Mood and Affect: Mood normal.         Thought Content: Thought content normal.          LAB:  All pertinent labs reviewed and interpreted.  Labs Ordered and Resulted from Time of ED Arrival to Time of ED Departure - No data to display     RADIOLOGY:  Reviewed all pertinent imaging. Please see official radiology report.  Chest XR,  PA & LAT   Final Result   IMPRESSION: Negative chest. Lungs are clear. No significant change.          Bia CALVILLO, am serving as a scribe to document services personally  performed by Estefanía Gomez PA-C, based on my observation and the provider's statements to me. I, Estefanía Gomez PA-C, attest that Bia Melchor is acting in a scribe capacity, has observed my performance of the services and has documented them in accordance with my direction.    Estefanía Gomez PA-C  [facility] EMERGENCY DEPARTMENT  [address] [place] [id number]  [phone]       Estefanía Gomez PA-C  02/20/23 1151

## 2023-02-20 NOTE — ED PROVIDER NOTES
"Emergency Department Midlevel Supervisory Note     I personally saw the patient and performed a substantive portion of the visit including all aspects of the medical decision making.    ED Course:  10:23 AM Estefanía Gomez PA-C staffed patient with me. I agree with their assessment and plan of management, and I will see the patient.  10:35 AM I met with the patient to introduce myself, gather additional history, perform my initial exam, and discuss the plan.     Brief HPI:     Molina Figueroa III is a 32 year old male who presents for evaluation of shortness of breath with 2 weeks of congestion and a runny nose. Two days ago he developed a productive cough that has been worsening. He has had one day of prednisone after last ER visit. He returns today due to increasing shortness of breath and chills.    I, Selma Gruber, am serving as a scribe to document services personally performed by Dr. Cuellar, based on my observations and the provider's statements to me.   I, Dr. Cuellar, attest that Selma Gruber was acting in a scribe capacity, has observed my performance of the services and has documented them in accordance with my direction.    Brief Physical Exam: BP (!) 148/90   Pulse 99   Temp 97.1  F (36.2  C)   Resp 18   Ht 1.854 m (6' 1\")   Wt 145.2 kg (320 lb)   SpO2 98%   BMI 42.22 kg/m    Constitutional:  Alert, in no acute distress  Respiratory:  Respirations even, unlabored, in no acute respiratory distress, frequent cough       MDM:  32-year-old male here for reevaluation,.  On chart review, he was seen here 2 days ago and had a full work-up including negative COVID and influenza, negative chest x-ray for pneumonia, negative D-dimer and I feel this adequately rules out PE, negative ACS work-up.  This is consistent with likely viral illness and he is both a smoker and has an asthma history.  He was prescribed prednisone and Tessalon but is only taken 1 day so far and I think at this point still having " this type of cough is typical.  We did repeat a chest x-ray just to rule out the development of a pneumonia and reassure him and this was negative.  We are going to try giving him a Respimat for home use to have a bronchodilator and see if it helps his cough, but ultimately this is consistent with a viral illness and I do not think he requires repeat laboratory testing today and will be discharged with instructions for expectant management.       1. Viral illness        Labs and Imaging:  Results for orders placed or performed during the hospital encounter of 02/20/23   Chest XR,  PA & LAT    Impression    IMPRESSION: Negative chest. Lungs are clear. No significant change.     I have reviewed the relevant laboratory and radiology studies    Procedures:  I was present for the key portions of this procedure: none    Kaitlynn Cuellar MD  [facility] EMERGENCY DEPARTMENT  [address] [place] [id number]  [phone]      Kaitlynn Cuellar MD  02/20/23 0806

## 2023-02-20 NOTE — ED TRIAGE NOTES
Pt coming in with c/o sob that has been ongoing for a cple days.  Pt was seen here 2 days ago and thought it was bronchitis.  Pt states coughing up a lot of gunk.  Pt had chills coming in today.

## 2023-02-20 NOTE — Clinical Note
Molina Figueroa was seen and treated in our emergency department on 2/20/2023.  He may return to work on 02/22/2023.       If you have any questions or concerns, please don't hesitate to call.      Estefanía Gomez, JOHN

## 2023-02-20 NOTE — DISCHARGE INSTRUCTIONS
You have a viral illness.  Continue to take the prednisone and the Tessalon Perles.  Use the Combivent inhaler as needed.  You may also take mucinex. Follow-up with your primary care doctor to discuss your ED visit.  Return to the ED if new symptoms develop or symptoms worsen.

## 2025-03-23 ENCOUNTER — APPOINTMENT (OUTPATIENT)
Dept: RADIOLOGY | Facility: HOSPITAL | Age: 35
End: 2025-03-23
Attending: EMERGENCY MEDICINE
Payer: MEDICAID

## 2025-03-23 ENCOUNTER — HOSPITAL ENCOUNTER (EMERGENCY)
Facility: HOSPITAL | Age: 35
Discharge: HOME | End: 2025-03-23
Attending: EMERGENCY MEDICINE | Admitting: EMERGENCY MEDICINE
Payer: MEDICAID

## 2025-03-23 VITALS
BODY MASS INDEX: 36.65 KG/M2 | TEMPERATURE: 98.1 F | DIASTOLIC BLOOD PRESSURE: 55 MMHG | SYSTOLIC BLOOD PRESSURE: 107 MMHG | OXYGEN SATURATION: 94 % | HEART RATE: 85 BPM | HEIGHT: 73 IN | RESPIRATION RATE: 18 BRPM | WEIGHT: 276.5 LBS

## 2025-03-23 DIAGNOSIS — J18.9 PNEUMONIA OF LEFT LOWER LOBE DUE TO INFECTIOUS ORGANISM: ICD-10-CM

## 2025-03-23 DIAGNOSIS — J45.901 ASTHMA WITH ACUTE EXACERBATION, UNSPECIFIED ASTHMA SEVERITY, UNSPECIFIED WHETHER PERSISTENT: ICD-10-CM

## 2025-03-23 PROBLEM — F15.11 HISTORY OF METHAMPHETAMINE ABUSE (H): Status: ACTIVE | Noted: 2019-02-01

## 2025-03-23 PROBLEM — E03.8 SUBCLINICAL HYPOTHYROIDISM: Status: ACTIVE | Noted: 2019-02-01

## 2025-03-23 PROBLEM — R60.0 LOWER EXTREMITY EDEMA: Status: ACTIVE | Noted: 2019-02-01

## 2025-03-23 PROBLEM — E78.5 DYSLIPIDEMIA: Status: ACTIVE | Noted: 2019-07-12

## 2025-03-23 PROBLEM — G47.30 SLEEP APNEA: Status: ACTIVE | Noted: 2019-07-12

## 2025-03-23 PROBLEM — M54.10 RADICULOPATHY OF LEG: Status: ACTIVE | Noted: 2021-05-17

## 2025-03-23 PROBLEM — F11.10 HEROIN ABUSE (H): Status: ACTIVE | Noted: 2019-02-01

## 2025-03-23 PROBLEM — M47.816 LUMBAR FACET ARTHROPATHY: Status: ACTIVE | Noted: 2022-10-14

## 2025-03-23 PROBLEM — M54.16 LUMBAR RADICULITIS: Status: ACTIVE | Noted: 2022-07-06

## 2025-03-23 PROCEDURE — 93005 ELECTROCARDIOGRAM TRACING: CPT | Performed by: EMERGENCY MEDICINE

## 2025-03-23 PROCEDURE — 71046 X-RAY EXAM CHEST 2 VIEWS: CPT

## 2025-03-23 PROCEDURE — 94640 AIRWAY INHALATION TREATMENT: CPT

## 2025-03-23 PROCEDURE — 99285 EMERGENCY DEPT VISIT HI MDM: CPT | Mod: 25

## 2025-03-23 PROCEDURE — 250N000013 HC RX MED GY IP 250 OP 250 PS 637: Performed by: EMERGENCY MEDICINE

## 2025-03-23 PROCEDURE — 250N000009 HC RX 250: Performed by: EMERGENCY MEDICINE

## 2025-03-23 PROCEDURE — 250N000012 HC RX MED GY IP 250 OP 636 PS 637: Performed by: EMERGENCY MEDICINE

## 2025-03-23 PROCEDURE — 87637 SARSCOV2&INF A&B&RSV AMP PRB: CPT | Performed by: EMERGENCY MEDICINE

## 2025-03-23 RX ORDER — AZITHROMYCIN 250 MG/1
500 TABLET, FILM COATED ORAL ONCE
Status: COMPLETED | OUTPATIENT
Start: 2025-03-23 | End: 2025-03-23

## 2025-03-23 RX ORDER — BUDESONIDE AND FORMOTEROL FUMARATE DIHYDRATE 80; 4.5 UG/1; UG/1
2 AEROSOL RESPIRATORY (INHALATION) EVERY 4 HOURS PRN
Qty: 10.2 G | Refills: 0 | Status: SHIPPED | OUTPATIENT
Start: 2025-03-23

## 2025-03-23 RX ORDER — PREDNISONE 20 MG/1
40 TABLET ORAL ONCE
Status: COMPLETED | OUTPATIENT
Start: 2025-03-23 | End: 2025-03-23

## 2025-03-23 RX ORDER — PREDNISONE 20 MG/1
TABLET ORAL
Qty: 8 TABLET | Refills: 0 | Status: SHIPPED | OUTPATIENT
Start: 2025-03-24 | End: 2025-03-23

## 2025-03-23 RX ORDER — AZITHROMYCIN 250 MG/1
250 TABLET, FILM COATED ORAL DAILY
Qty: 4 TABLET | Refills: 0 | Status: SHIPPED | OUTPATIENT
Start: 2025-03-24

## 2025-03-23 RX ORDER — AZITHROMYCIN 250 MG/1
250 TABLET, FILM COATED ORAL DAILY
Qty: 4 TABLET | Refills: 0 | Status: SHIPPED | OUTPATIENT
Start: 2025-03-24 | End: 2025-03-23

## 2025-03-23 RX ORDER — ALBUTEROL SULFATE 0.83 MG/ML
5 SOLUTION RESPIRATORY (INHALATION) ONCE
Status: COMPLETED | OUTPATIENT
Start: 2025-03-23 | End: 2025-03-23

## 2025-03-23 RX ORDER — PREDNISONE 20 MG/1
TABLET ORAL
Qty: 8 TABLET | Refills: 0 | Status: SHIPPED | OUTPATIENT
Start: 2025-03-24

## 2025-03-23 RX ORDER — IPRATROPIUM BROMIDE AND ALBUTEROL SULFATE 2.5; .5 MG/3ML; MG/3ML
3 SOLUTION RESPIRATORY (INHALATION) ONCE
Status: COMPLETED | OUTPATIENT
Start: 2025-03-23 | End: 2025-03-23

## 2025-03-23 RX ORDER — BUDESONIDE AND FORMOTEROL FUMARATE DIHYDRATE 80; 4.5 UG/1; UG/1
2 AEROSOL RESPIRATORY (INHALATION) EVERY 4 HOURS PRN
Qty: 10.2 G | Refills: 0 | Status: SHIPPED | OUTPATIENT
Start: 2025-03-23 | End: 2025-03-23

## 2025-03-23 RX ADMIN — IPRATROPIUM BROMIDE AND ALBUTEROL SULFATE 3 ML: .5; 3 SOLUTION RESPIRATORY (INHALATION) at 13:44

## 2025-03-23 RX ADMIN — PREDNISONE 40 MG: 20 TABLET ORAL at 13:43

## 2025-03-23 RX ADMIN — AZITHROMYCIN DIHYDRATE 500 MG: 250 TABLET, FILM COATED ORAL at 15:20

## 2025-03-23 RX ADMIN — AMOXICILLIN AND CLAVULANATE POTASSIUM 1 TABLET: 875; 125 TABLET, FILM COATED ORAL at 15:22

## 2025-03-23 RX ADMIN — ALBUTEROL SULFATE 5 MG: 2.5 SOLUTION RESPIRATORY (INHALATION) at 15:09

## 2025-03-23 ASSESSMENT — ACTIVITIES OF DAILY LIVING (ADL)
ADLS_ACUITY_SCORE: 41

## 2025-03-23 ASSESSMENT — COLUMBIA-SUICIDE SEVERITY RATING SCALE - C-SSRS
1. IN THE PAST MONTH, HAVE YOU WISHED YOU WERE DEAD OR WISHED YOU COULD GO TO SLEEP AND NOT WAKE UP?: NO
6. HAVE YOU EVER DONE ANYTHING, STARTED TO DO ANYTHING, OR PREPARED TO DO ANYTHING TO END YOUR LIFE?: NO
2. HAVE YOU ACTUALLY HAD ANY THOUGHTS OF KILLING YOURSELF IN THE PAST MONTH?: NO

## 2025-03-23 NOTE — DISCHARGE INSTRUCTIONS
As discussed your viral swabs were negative for COVID, influenza and RSV today  Your x-ray shows a possible developing pneumonia, we will cover you with antibiotics  Next dose of azithromycin due tomorrow.  I would take another dose of the Augmentin before bed tonight  Prednisone for 4 more days, next dose due tomorrow  Continue albuterol at home, Symbicort refill  Avoid any smoking  Follow-up close with your primary doctor, return if any acute worsening symptoms

## 2025-03-23 NOTE — ED TRIAGE NOTES
Patient comes with c/o shortness of breath, started with fever on Thursday. Feels that breathing is worsening, hx of asthma and inhaler not helping.      Triage Assessment (Adult)       Row Name 03/23/25 1246          Triage Assessment    Airway WDL WDL     Additional Documentation Breath Sounds (Group)        Respiratory WDL    Respiratory WDL WDL        Breath Sounds    Breath Sounds RUL;MICHAEL;LLL;RLL     MICHAEL Breath Sounds diminished     LLL Breath Sounds diminished     RUL Breath Sounds coarse;diminished     RLL Breath Sounds diminished        Skin Circulation/Temperature WDL    Skin Circulation/Temperature WDL WDL        Cardiac WDL    Cardiac WDL WDL        Peripheral/Neurovascular WDL    Peripheral Neurovascular WDL WDL        Cognitive/Neuro/Behavioral WDL    Cognitive/Neuro/Behavioral WDL WDL

## 2025-03-23 NOTE — ED PROVIDER NOTES
EMERGENCY DEPARTMENT ENCOUNTER      NAME: Molina Figueroa III  AGE: 34 year old male  YOB: 1990  MRN: [id number]  EVALUATION DATE & TIME: 3/23/2025 12:51 PM    PCP: [facility]    ED PROVIDER: Aimee Moreno DO      Chief Complaint   Patient presents with    Shortness of Breath         FINAL IMPRESSION:  1. Asthma with acute exacerbation, unspecified asthma severity, unspecified whether persistent    2. Pneumonia of left lower lobe due to infectious organism          ED COURSE & MEDICAL DECISION MAKING:    Pertinent Labs & Imaging studies reviewed. (See chart for details)  1:00 PM I met the patient and performed my initial interview and exam.  1:30 PM I independently interpreted patient's ECG.  2:55 PM I rechecked and updated the patient, who is still wheezing. I ordered another neb.  3:26 PM I rechecked and updated the patient.   3:57 PM I rechecked and updated the patient. I discussed the plan for discharge with the patient, and patient is agreeable. We discussed supportive cares at home and reasons for return to the ER including new or worsening symptoms - all questions and concerns addressed. Patient to be discharged by RN.     34 year old male presents to the Emergency Department for evaluation of shortness of breath, cough.    Medical Decision Making: I was concerned about this patient's shortness of breath and considered multiple causes including but not limited to the following.    PE:  Wells Criteria is low risk.  PERC negative.  D-dimer deferred  ACS:  EKG does not show acute ischemic changesArrhythmia:  EKG shows no dysrhythmia.    Tamponade:  EKG shows no decreased voltage, heart sounds are not diminished on exam.    COPD/Asthma:  History of asthma and wheezing on exam.Pneumonia:  Oxygen sats are normal.  Chest Xray with possible developing pneumonia  Acute CHF:  Lower extremity edema is not. CXR without pulmonary congestion.   Lung mass/effusion:  CXR does not show  lung mass/effusion.    Abdominal mass/ascites:  Exam is not concerning for ascites/mass.         Impression: Based on this patient's history, exam, and diagnostic results, the working diagnosis of this patient's shortness of breath is asthma exacerbation and developing pneumonia..           Discharge Plan:  Patient is appropriate for outpatient management with medications per discharge instructions.  Patient was given verbal and written discharge instructions for follow up as well as indications for return to the Emergency Department.         At the conclusion of the encounter I discussed the results of all of the tests and the disposition. The questions were answered. The patient or family acknowledged understanding and was agreeable with the care plan.     Medical Decision Making  Obtained supplemental history:Supplemental history obtained?: N/A  Reviewed external records: External records reviewed?: Per HPI  Care impacted by chronic illness:Alcohol and/or Drug Abuse or Dependence, Chronic Lung Disease, Hypertension, and Mental Health  Did you consider but not order tests?: Work up considered but not performed and documented in chart, if applicable  Did you interpret images independently?: Independent interpretation of ECG and images noted in documentation, when applicable.  Consultation discussion with other provider:Did you involve another provider (consultant, , pharmacy, etc.)?: No  Discharge. I prescribed additional prescription strength medication(s) as charted. I considered admission, but discharged patient after significant clinical improvement.    MIPS (CTPE, Dental pain, Paula, Sinusitis, Asthma/COPD, Head Trauma): Not Applicable    SEPSIS: None      MEDICATIONS GIVEN IN THE EMERGENCY:  Medications   predniSONE (DELTASONE) tablet 40 mg (40 mg Oral $Given 3/23/25 1344)   ipratropium - albuterol 0.5 mg/2.5 mg/3 mL (DUONEB) neb solution 3 mL (3 mLs Nebulization $Given 3/23/25 1344)   albuterol (PROVENTIL)  neb solution 5 mg (5 mg Nebulization $Given 3/23/25 1400)   amoxicillin-clavulanate (AUGMENTIN) 875-125 MG per tablet 1 tablet (1 tablet Oral $Given 3/23/25 3562)   azithromycin (ZITHROMAX) tablet 500 mg (500 mg Oral $Given 3/23/25 1522)       NEW PRESCRIPTIONS STARTED AT TODAY'S ER VISIT  New Prescriptions    No medications on file          =================================================================    HPI    Patient information was obtained from: patient     Use of : N/A         Molina DAVIS Henry MARTIN is a 34 year old male with a pertinent history of asthma, anxiety, PTSD, bipolar 1 disorder, heroin abuse, and methamphetamine abuse who presents to this ED by walk-in for evaluation of shortness of breath.    Patient endorses 4 days of persistent shortness of breath and wheezing that may be worsening. His inhaler is not helping. He had a fever a couple of days ago that resolved, and flu-like symptoms that resolved. Laying flat makes his symptoms worse and makes him feel like there is fluid on his lungs. While walking into the ED, patient had an episode of left-sided chest pain with deep breaths. Reports history of asthma.    Additional history of mental health problems and hypothyroidism on medication. Denies history of hypertension, heart problems, or blood clots.    Patient also denies cough, other chest pain, vomiting, sick contacts, and any other symptoms or complaints at this time.     ScHx: Patient is a smoker.      REVIEW OF SYSTEMS   Per HPI    PAST MEDICAL HISTORY:  Past Medical History:   Diagnosis Date    Anxiety     Anxiety state     Anxiety    Asthma     No Comments Provided    Binge eating     Bipolar 1 disorder (H)     not extinguished between bipolar 1 or 2 per pt    Depressive disorder     Insomnia     Obsessive-compulsive disorder     Obsessive compulsive disorder    Posttraumatic stress disorder     Post traumatic stress disorder    PTSD (post-traumatic stress disorder)      "Thyroid disease        PAST SURGICAL HISTORY:  History reviewed. No pertinent surgical history.        CURRENT MEDICATIONS:    albuterol (PROAIR HFA/PROVENTIL HFA/VENTOLIN HFA) 108 (90 Base) MCG/ACT inhaler  benzonatate (TESSALON) 100 MG capsule  furosemide (LASIX) 20 MG tablet  gabapentin (NEURONTIN) 300 MG capsule  hydrocortisone with aloe 1 % Crea cream  levothyroxine (SYNTHROID/LEVOTHROID) 100 MCG tablet  mirtazapine (REMERON SOL-TAB) 15 MG ODT  predniSONE (DELTASONE) 20 MG tablet  venlafaxine (EFFEXOR) 37.5 MG tablet         ALLERGIES:  No Known Allergies    FAMILY HISTORY:  No family history on file.    SOCIAL HISTORY:   Social History     Socioeconomic History    Marital status: Single   Tobacco Use    Smoking status: Every Day    Smokeless tobacco: Current   Substance and Sexual Activity    Alcohol use: Not Currently    Drug use: Yes     Types: Methamphetamines     Comment: heroine last use wednesday night   Social History Narrative    ** Merged History Encounter **         Larry is a man of few words.  He is currently at [facility].  He does request a refill of his medications and they were done.  Complete review of systems was unremarkable.  Preload  04/01/2013       VITALS:  /55   Pulse 91   Temp 98.1  F (36.7  C) (Oral)   Resp 18   Ht 1.854 m (6' 1\")   Wt 125.4 kg (276 lb 8 oz)   SpO2 100%   BMI 36.48 kg/m      PHYSICAL EXAM    Physical Exam  Constitutional:       General: He is not in acute distress.  HENT:      Head: Normocephalic and atraumatic.      Mouth/Throat:      Pharynx: Oropharynx is clear.   Eyes:      Pupils: Pupils are equal, round, and reactive to light.   Cardiovascular:      Rate and Rhythm: Normal rate and regular rhythm.      Pulses: Normal pulses.      Heart sounds: Normal heart sounds.   Pulmonary:      Effort: Pulmonary effort is normal.      Breath sounds: Wheezing present.   Abdominal:      General: Abdomen is flat. Bowel sounds are normal.      Palpations: Abdomen " is soft.      Tenderness: There is no abdominal tenderness.   Musculoskeletal:         General: Normal range of motion.   Skin:     General: Skin is warm and dry.      Capillary Refill: Capillary refill takes less than 2 seconds.   Neurological:      General: No focal deficit present.      Mental Status: He is alert and oriented to person, place, and time.           LAB:  All pertinent labs reviewed and interpreted.  Labs Ordered and Resulted from Time of ED Arrival to Time of ED Departure   INFLUENZA A/B, RSV AND SARS-COV2 PCR - Normal       Result Value    Influenza A PCR Negative      Influenza B PCR Negative      RSV PCR Negative      SARS CoV2 PCR Negative         RADIOLOGY:  Reviewed all pertinent imaging. Please see official radiology report.  Chest XR,  PA & LAT   Final Result   IMPRESSION: Normal size of cardiomediastinal silhouette. There are some subtle alveolar opacities in the left lung, developing infectious/inflammatory process is possible. Right lung is grossly clear. No pleural effusion or pneumothorax. No acute bony    abnormality.          EKG:    Performed at: 13:22    Impression: Sinus rhythm.    Rate: 87  Rhythm: Sinus  Axis: 76  KS Interval: 136  QRS Interval: 98  QTc Interval: 428  ST Changes: None  Comparison: When compared with ECG of 18-Feb-2023 14:45, No significant change was found.     I have independently reviewed and interpreted the EKG(s) documented above.    I, Tiffany Palomo, am serving as a scribe to document services personally performed by Dr. Aimee Moreno based on my observation and the provider's statements to me. I, Aimee Moreno, DO attest that Tiffany Palomo is acting in a scribe capacity, has observed my performance of the services and has documented them in accordance with my direction.    Aimee Moreno DO  Emergency Medicine  [facility] EMERGENCY DEPARTMENT  [address] [place] [id number]  [phone]  Dept: [phone]       Ohbrittney, Aimee GORDON,   03/23/25 1751

## 2025-03-27 LAB
ATRIAL RATE - MUSE: 87 BPM
DIASTOLIC BLOOD PRESSURE - MUSE: 59 MMHG
INTERPRETATION ECG - MUSE: NORMAL
P AXIS - MUSE: 53 DEGREES
PR INTERVAL - MUSE: 136 MS
QRS DURATION - MUSE: 98 MS
QT - MUSE: 356 MS
QTC - MUSE: 428 MS
R AXIS - MUSE: 76 DEGREES
SYSTOLIC BLOOD PRESSURE - MUSE: 116 MMHG
T AXIS - MUSE: 67 DEGREES
VENTRICULAR RATE- MUSE: 87 BPM